# Patient Record
Sex: FEMALE | Race: WHITE
[De-identification: names, ages, dates, MRNs, and addresses within clinical notes are randomized per-mention and may not be internally consistent; named-entity substitution may affect disease eponyms.]

---

## 2017-04-23 ENCOUNTER — HOSPITAL ENCOUNTER (EMERGENCY)
Dept: HOSPITAL 47 - EC | Age: 27
Discharge: HOME | End: 2017-04-23
Payer: COMMERCIAL

## 2017-04-23 VITALS
HEART RATE: 70 BPM | DIASTOLIC BLOOD PRESSURE: 71 MMHG | SYSTOLIC BLOOD PRESSURE: 113 MMHG | TEMPERATURE: 96.8 F | RESPIRATION RATE: 18 BRPM
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
## 2017-04-23 DIAGNOSIS — Z88.8: ICD-10-CM

## 2017-04-23 DIAGNOSIS — F90.9: ICD-10-CM

## 2017-04-23 DIAGNOSIS — Z79.899: ICD-10-CM

## 2017-04-23 DIAGNOSIS — S60.222A: Primary | ICD-10-CM

## 2017-04-23 DIAGNOSIS — E07.9: ICD-10-CM
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
## 2017-04-23 PROCEDURE — 99283 EMERGENCY DEPT VISIT LOW MDM: CPT

## 2017-04-23 NOTE — XR
Left hand

 

HISTORY: Trauma and pain

 

Reviews of the left hand

 

No comparisons

 

Bone mineralization, joint spaces and alignment are maintained

 

IMPRESSION: No fracture or dislocation.

## 2017-04-23 NOTE — ED
Upper Extremity HPI





- General


Chief Complaint: Extremity Injury, Upper


Stated Complaint: hand injury


Time Seen by Provider: 04/23/17 21:32


Source: patient


Mode of arrival: ambulatory


Limitations: no limitations





- History of Present Illness


Initial Comments: 


Patient is a right-handed 26-year-old white female presenting to the emergency 

department with complaints of left hand pain.  Patient states she was doing 

yard work at 11 AM this morning when a log rolled on her hand.  Patient states 

that throughout the day the pain became aggressively worse and she wanted to 

make sure nothing was broken.  Patient denies previous trauma or surgery to 

left upper extremity.  Patient states she took some Motrin about 1:00 this 

afternoon with relief.  Patient currently rates pain 6 out of 10, described as 

sore, exacerbated with movement and palpation, relieved with rest, denies 

numbness or tingling.





Handedness: right


Place: outdoors


Severity scale (1-10): 7


Improves With: immobilization


Worsens With: movement of extremity


Associated Symptoms: denies other symptoms





- Related Data


 Home Medications











 Medication  Instructions  Recorded  Confirmed


 


Levothyroxine Sodium [Synthroid] 100 mcg PO DAILY 03/03/15 01/13/16


 


Dextroamphetamine/Amphetamine 20 mg PO BID 01/13/16 01/13/16





[Adderall]   








 Previous Rx's











 Medication  Instructions  Recorded


 


Benzonatate [Tessalon] 200 mg PO TID PRN #20 cap 01/13/16











 Allergies











Allergy/AdvReac Type Severity Reaction Status Date / Time


 


prochlorperazine maleate Allergy  Unknown Verified 04/23/17 21:32





[From Compazine]     














Review of Systems


ROS Statement: 


Those systems with pertinent positive or pertinent negative responses have been 

documented in the HPI.





ROS Other: All systems not noted in ROS Statement are negative.





Past Medical History


Past Medical History: Thyroid Disorder


Additional Past Medical History / Comment(s): Mild Anemia.  known B-thalasemia 

minor.  Rubella Non-Immune


History of Any Multi-Drug Resistant Organisms: None Reported


Past Surgical History: Cholecystectomy


Additional Past Surgical History / Comment(s): Thyroidectomy


Past Psychological History: ADD/ADHD, Anxiety


Smoking Status: Never smoker


Past Alcohol Use History: Occasional


Past Drug Use History: None Reported





General Exam





- General Exam Comments


Initial Comments: 


GENERAL: Pt awake and alert, well-appearing, well-nourished, and in no acute 

distress.


HEAD: Atraumatic, normocephalic.


EYES: Pupils equal, round, and reactive to light, sclera anicteric, conjunctiva 

are normal.


ENT: Oropharynx clear without exudates.  Moist mucous membranes. 


NECK:Normal range of motion, supple without lymphadenopathy or JVD. 


LUNGS: Breath sounds clear to auscultation bilaterally.  No wheezes, rales, or 

rhonchi.


HEART: Heart S1, S2, no S3 or S4.  Regular rate and rhythm.  No murmurs, rubs 

or gallops.


ABDOMEN: Soft, nontender, nondistended, normoactive bowel sounds.  No guarding, 

no rebound.  No masses or organomegaly appreciated. 


EXTREMITIES: Palpable peripheral pulses.  Ecchymosis noted to the dorsal left 

hand.


NEUROLOGICAL: Pt oriented x 3. Cranial nerves II through XII grossly intact. 

Strength and sensation grossly intact.


PSYCH: Normal mood, normal affect.


SKIN: Warm, dry, intact.





Limitations: no limitations


  ** Left


Forearm Wrist exam: Present: normal inspection, full ROM.  Absent: tenderness, 

swelling


Hand Wrist exam: Present: full ROM, tenderness, swelling, ecchymosis.  Absent: 

deformity, dislocation, erythema


Neuro motor exam: Present: wrist extension intact, thumb opposition intact, 

thumb IP flexion intact, thumb adduction intact, fingers 2-5 abduction intact


Neurosensory exam: Present: 2-point discrimination, radial nerve intact, ulnar 

nerve intact, median nerve intact


Vascular: Present: vascular compromise, normal capillary refill, radial pulse, 

brachial pulse, ulnar pulse





Course


 Vital Signs











  04/23/17





  21:27


 


Temperature 96.8 F L


 


Pulse Rate 70


 


Respiratory 18





Rate 


 


Blood Pressure 113/71


 


O2 Sat by Pulse 100





Oximetry 














Medical Decision Making





- Medical Decision Making


Contusion to left hand.  X-ray of left hand negative for fracture or 

dislocation.  Patient instructed to follow-up with primary care physician or 

orthopedic surgeon if symptoms do not improve or get worse.  Discharge 

instructions and return parameters reviewed with patient.  Patient agrees with 

treatment plan.








- Radiology Data


Radiology results: report reviewed


X-ray of left hand: No fracture or dislocation.  As read by radiologist.





Disposition


Clinical Impression: 


 Contusion of left hand





Disposition: HOME SELF-CARE


Condition: Good


Instructions:  Contusion in Adults (ED)


Additional Instructions: 


Continue Motrin and Tylenol for swelling or pain.  May continue ice 4 times a 

day for 10-15 minutes the next 2 days to decrease swelling.  Follow-up with 

primary care physician as needed.  Please return to the emergency department 

with any worsening symptoms.


Referrals: 


Israel Winston MD [Primary Care Provider] - 1-2 days


Amor Cid DO [Doctor of Osteopathic Medicine] - 1-2 days (Follow-up as 

needed)


Time of Disposition: 21:59

## 2018-04-12 ENCOUNTER — HOSPITAL ENCOUNTER (OUTPATIENT)
Dept: HOSPITAL 47 - FBPOP | Age: 28
Discharge: HOME | End: 2018-04-12
Payer: COMMERCIAL

## 2018-04-12 VITALS
HEART RATE: 78 BPM | TEMPERATURE: 97 F | SYSTOLIC BLOOD PRESSURE: 112 MMHG | RESPIRATION RATE: 16 BRPM | DIASTOLIC BLOOD PRESSURE: 67 MMHG

## 2018-04-12 DIAGNOSIS — O26.92: Primary | ICD-10-CM

## 2018-04-12 DIAGNOSIS — Z3A.24: ICD-10-CM

## 2018-04-12 LAB
PH UR: 5.5 [PH] (ref 5–8)
RBC UR QL: 1 /HPF (ref 0–5)
SP GR UR: 1.02 (ref 1–1.03)
SQUAMOUS UR QL AUTO: 10 /HPF (ref 0–4)
UROBILINOGEN UR QL STRIP: <2 MG/DL (ref ?–2)
WBC #/AREA URNS HPF: 1 /HPF (ref 0–5)

## 2018-04-12 PROCEDURE — 81001 URINALYSIS AUTO W/SCOPE: CPT

## 2018-04-12 PROCEDURE — 99213 OFFICE O/P EST LOW 20 MIN: CPT

## 2018-04-23 ENCOUNTER — HOSPITAL ENCOUNTER (OUTPATIENT)
Dept: HOSPITAL 47 - FBPOP | Age: 28
Discharge: HOME | End: 2018-04-23
Payer: COMMERCIAL

## 2018-04-23 VITALS
RESPIRATION RATE: 16 BRPM | SYSTOLIC BLOOD PRESSURE: 122 MMHG | HEART RATE: 80 BPM | TEMPERATURE: 97.6 F | DIASTOLIC BLOOD PRESSURE: 62 MMHG

## 2018-04-23 DIAGNOSIS — Z3A.25: ICD-10-CM

## 2018-04-23 DIAGNOSIS — R10.30: ICD-10-CM

## 2018-04-23 DIAGNOSIS — O26.93: Primary | ICD-10-CM

## 2018-04-23 PROCEDURE — 99213 OFFICE O/P EST LOW 20 MIN: CPT

## 2018-05-06 NOTE — P.MSEPDOC
Presenting Problems





- Arrival Data


Date of Arrival on Unit: 18


Time of Arrival on Unit: 19:50


Mode of Transport: Ambulatory





- Complaint


Comment: Complaints of contractions.





Prenatal Medical History





- Pregnancy Information


: 2


Para: 1


Term: 1


: 0


Abortions: Spontaneous or Elective: 0


Number of Living Children: 1





- Gestational Age


Gestational Age by DESTINEY (wks/days): 24 Weeks and 1 Days





Review of Systems





- Review of Systems


Constitutional: No problems


Breast: No problems


ENT: No problems


Cardiovascular: No problems


Respiratory: No problems


Gastrointestinal: No problems


Genitourinary: No problems


Musculoskeletal: No problems


Neurological: No problems


Skin: No problems





Vital Signs





- Temperature


Temperature: 97 F


Temperature Source: Temporal Artery Scan





- Pulse


  ** Right Brachial


Pulse Rate: 78


Pulse Assessment Method: Automatic Cuff





- Respirations


Respiratory Rate: 16


Oxygen Delivery Method: Room Air





- Blood Pressure


  ** Right Arm


Blood Pressure: 112/67


Blood Pressure Mean: 82


Blood Pressure Source: Automatic Cuff





Medical Screen Scoring (Pre)





- Cervical Exam


Dilation: Exam Deferred


Effacement: Exam Deferred


Membranes: Intact





- Uterine Contractions


Frequency: N/A


Duration: N/A


Intensity: N/A





- Maternal Vital Signs


Maternal Temperature: N/A


Maternal Blood Pressure: N/A


Signs of Preeclampsia: N/A


Maternal Respirations: N/A





- Pain Assessment


Pain Scale Used: Numeric (1 - 10)


Pain Intensity: 0





- Fetal Assessment


Baseline FHR: 140


Fetal Heart Rate - NICHD Category: Category I (Normal) = 0


Fetal Position: N/A


Fetal Station: N/A





- Total Score


Total Score (Pre): 0





- Level of Risk


Level of Risk: Low (0-5)





Physician Notification (Pre)





- Physician Notified


Physician Notified Date: 18


Physician Notified Time: 20:28


Physician/Practitioner Notifed:: Dr. De Jesus


Spoke With: Dr. De Jesus


New Order Received: Yes





- Notification Comment


Comment: agree with RNs assessment





Disposition





- Disposition


OB Disposition: Discharge to home, Written follow up instructions reviewed


Discharge Date: 18


Discharge Time: 20:30


I agree with the RN Medical Screening Exam: Yes


Risk & Benefit of care provided described in d/c instruction: Yes


Diagnosis: PREGNANCY RELATED CONDITIONS, UNSPECIFIED, SECOND TRIMESTER

## 2018-05-06 NOTE — P.MSEPDOC
Presenting Problems





- Arrival Data


Date of Arrival on Unit: 18


Time of Arrival on Unit: 09:06


Mode of Transport: Wheelchair





- Complaint


OB-Reason for Admission/Chief Complaint: Pain


Comment: lower abd pain and pressure. constant





Prenatal Medical History





- Pregnancy Information


: 2


Para: 1


Term: 1


: 0


Abortions: Spontaneous or Elective: 0


Number of Living Children: 1





- Gestational Age


Gestational Age by DESTINEY (wks/days): 25 Weeks and 5 Days





Review of Systems





- Review of Systems


Constitutional: No problems


Breast: No problems


ENT: No problems


Cardiovascular: No problems


Respiratory: No problems


Gastrointestinal: No problems


Genitourinary: No problems


Musculoskeletal: No problems


Neurological: No problems


Skin: No problems


Comment: hx of pain and pressure lower abd, after working as  3 nights. 

pain scale 10 today. on pastc/s incision line incision line





Vital Signs





- Temperature


Temperature: 97.6 F


Temperature Source: Tympanic





- Pulse


  ** Right Radial


Pulse Rate: 80


Pulse Assessment Method: Automatic Cuff





- Respirations


Respiratory Rate: 16


Oxygen Delivery Method: Room Air





- Blood Pressure


  ** Right Arm


Blood Pressure: 122/62


Blood Pressure Mean: 82


Blood Pressure Source: Automatic Cuff





Medical Screen Scoring (Pre)





- Cervical Exam


Dilation: Exam Deferred





- Uterine Contractions


Frequency: N/A





- Maternal Vital Signs


Maternal Temperature: N/A


Maternal Blood Pressure: N/A


Signs of Preeclampsia: N/A


Maternal Respirations: N/A





- Pain Assessment


Pain Location and Character: Lower, Abdomen


Pain Scale Used: Numeric (1 - 10)


Pain Description: *Acute, Burning, Pressure


Pain Frequency: Constant


Pain Duration: 10


Pain Behavior: Facial Grimacing, Guarding, Moving Slowly


Pain Aggravating Factors: Standing, Walking


Pharmacological Interventions: Discuss Pain Med Options, PRN Medication





- Maternal Trauma


Maternal Trauma: N/A





- Fetal Assessment


Baseline FHR: 140


Fetal Heart Rate - NICHD Category: Category I (Normal) = 0


NST: Reactive





- Total Score


Total Score (Pre): 0





- Level of Risk


Level of Risk: Low (0-5)





Physician Notification (Pre)





- Physician Notified


Physician Notified Date: 18


Physician Notified Time: 10:10


Physician/Practitioner Notifed:: chen


Spoke With: chen


New Order Received: Yes





- Notification Comment


Comment: home off work next couple of days. if not emproving to call office or 

return to triage. warm to lower abd, tylenol prn





Disposition





- Disposition


OB Disposition: Discharge to home


Discharge Date: 18


Discharge Time: 10:20


I agree with the RN Medical Screening Exam: Yes


Risk & Benefit of care provided described in d/c instruction: Yes


Diagnosis: PREGNANCY RELATED CONDITIONS, UNSPECIFIED, SECOND TRIMESTER

## 2018-07-05 ENCOUNTER — HOSPITAL ENCOUNTER (OUTPATIENT)
Dept: HOSPITAL 47 - FBPOP | Age: 28
Discharge: HOME | End: 2018-07-05
Attending: OBSTETRICS & GYNECOLOGY
Payer: COMMERCIAL

## 2018-07-05 VITALS
HEART RATE: 99 BPM | RESPIRATION RATE: 16 BRPM | DIASTOLIC BLOOD PRESSURE: 71 MMHG | SYSTOLIC BLOOD PRESSURE: 117 MMHG | TEMPERATURE: 97.4 F

## 2018-07-05 DIAGNOSIS — Z3A.36: ICD-10-CM

## 2018-07-05 DIAGNOSIS — O26.93: Primary | ICD-10-CM

## 2018-07-05 PROCEDURE — 59025 FETAL NON-STRESS TEST: CPT

## 2018-07-05 PROCEDURE — 99213 OFFICE O/P EST LOW 20 MIN: CPT

## 2018-07-11 ENCOUNTER — HOSPITAL ENCOUNTER (OUTPATIENT)
Dept: HOSPITAL 47 - FBPOP | Age: 28
Discharge: HOME | End: 2018-07-11
Attending: OBSTETRICS & GYNECOLOGY
Payer: COMMERCIAL

## 2018-07-11 VITALS
HEART RATE: 83 BPM | SYSTOLIC BLOOD PRESSURE: 129 MMHG | TEMPERATURE: 97.1 F | DIASTOLIC BLOOD PRESSURE: 69 MMHG | RESPIRATION RATE: 18 BRPM

## 2018-07-11 DIAGNOSIS — Z3A.37: ICD-10-CM

## 2018-07-11 DIAGNOSIS — O26.93: Primary | ICD-10-CM

## 2018-07-11 LAB
PH UR: 6.5 [PH] (ref 5–8)
RBC UR QL: <1 /HPF (ref 0–5)
SP GR UR: 1.01 (ref 1–1.03)
SQUAMOUS UR QL AUTO: 3 /HPF (ref 0–4)
UROBILINOGEN UR QL STRIP: <2 MG/DL (ref ?–2)
WBC #/AREA URNS HPF: 1 /HPF (ref 0–5)

## 2018-07-11 PROCEDURE — 81001 URINALYSIS AUTO W/SCOPE: CPT

## 2018-07-11 PROCEDURE — 59025 FETAL NON-STRESS TEST: CPT

## 2018-07-11 PROCEDURE — 99213 OFFICE O/P EST LOW 20 MIN: CPT

## 2018-07-25 ENCOUNTER — HOSPITAL ENCOUNTER (INPATIENT)
Dept: HOSPITAL 47 - 4FBP | Age: 28
LOS: 2 days | Discharge: HOME | End: 2018-07-27
Attending: OBSTETRICS & GYNECOLOGY | Admitting: OBSTETRICS & GYNECOLOGY
Payer: COMMERCIAL

## 2018-07-25 VITALS — BODY MASS INDEX: 32.1 KG/M2

## 2018-07-25 DIAGNOSIS — D56.3: ICD-10-CM

## 2018-07-25 DIAGNOSIS — Z86.59: ICD-10-CM

## 2018-07-25 DIAGNOSIS — K21.9: ICD-10-CM

## 2018-07-25 DIAGNOSIS — Z30.2: ICD-10-CM

## 2018-07-25 DIAGNOSIS — N85.8: ICD-10-CM

## 2018-07-25 DIAGNOSIS — Z90.49: ICD-10-CM

## 2018-07-25 DIAGNOSIS — Z79.899: ICD-10-CM

## 2018-07-25 DIAGNOSIS — O34.211: Primary | ICD-10-CM

## 2018-07-25 DIAGNOSIS — Z3A.39: ICD-10-CM

## 2018-07-25 DIAGNOSIS — E89.0: ICD-10-CM

## 2018-07-25 LAB
BASOPHILS # BLD AUTO: 0 K/UL (ref 0–0.2)
BASOPHILS NFR BLD AUTO: 0 %
EOSINOPHIL # BLD AUTO: 0.1 K/UL (ref 0–0.7)
EOSINOPHIL NFR BLD AUTO: 1 %
ERYTHROCYTE [DISTWIDTH] IN BLOOD BY AUTOMATED COUNT: 4.6 M/UL (ref 3.8–5.4)
ERYTHROCYTE [DISTWIDTH] IN BLOOD: 17.7 % (ref 11.5–15.5)
HCT VFR BLD AUTO: 28.7 % (ref 34–46)
HGB BLD-MCNC: 8.6 GM/DL (ref 11.4–16)
LYMPHOCYTES # SPEC AUTO: 1.7 K/UL (ref 1–4.8)
LYMPHOCYTES NFR SPEC AUTO: 19 %
MCH RBC QN AUTO: 18.7 PG (ref 25–35)
MCHC RBC AUTO-ENTMCNC: 30 G/DL (ref 31–37)
MCV RBC AUTO: 62.4 FL (ref 80–100)
MONOCYTES # BLD AUTO: 0.5 K/UL (ref 0–1)
MONOCYTES NFR BLD AUTO: 5 %
NEUTROPHILS # BLD AUTO: 6.2 K/UL (ref 1.3–7.7)
NEUTROPHILS NFR BLD AUTO: 72 %
PLATELET # BLD AUTO: 118 K/UL (ref 150–450)
WBC # BLD AUTO: 8.7 K/UL (ref 3.8–10.6)

## 2018-07-25 PROCEDURE — 85025 COMPLETE CBC W/AUTO DIFF WBC: CPT

## 2018-07-25 PROCEDURE — 86900 BLOOD TYPING SEROLOGIC ABO: CPT

## 2018-07-25 PROCEDURE — 86901 BLOOD TYPING SEROLOGIC RH(D): CPT

## 2018-07-25 PROCEDURE — 86850 RBC ANTIBODY SCREEN: CPT

## 2018-07-25 PROCEDURE — 0UL70CZ OCCLUSION OF BILATERAL FALLOPIAN TUBES WITH EXTRALUMINAL DEVICE, OPEN APPROACH: ICD-10-PCS

## 2018-07-25 RX ADMIN — KETOROLAC TROMETHAMINE PRN MG: 30 INJECTION, SOLUTION INTRAMUSCULAR at 20:44

## 2018-07-25 RX ADMIN — KETOROLAC TROMETHAMINE PRN MG: 30 INJECTION, SOLUTION INTRAMUSCULAR at 13:37

## 2018-07-25 RX ADMIN — DOCUSATE SODIUM AND SENNOSIDES SCH EACH: 50; 8.6 TABLET ORAL at 20:44

## 2018-07-25 NOTE — P.OP
Date of Procedure: 18


Preoperative Diagnosis: 


#1.  39-0/7 weeks, previous  section #2.  Undesired fertility


Postoperative Diagnosis: 


Same


Procedure(s) Performed: 


#1.  Repeat low transverse  section #2.  Intraoperative bilateral tubal 

occlusion with Filshie clips


Anesthesia: spinal


Surgeon: Shaka De Jesus


Assistant #1: Nadiya Bowman


Estimated Blood Loss (ml): 800


IV fluids (ml): 1,100


Urine output (ml): 250


Pathology: none sent


Condition: stable


Disposition: PACU


Operative Findings: 


Intraoperatively, there was a mild to moderate amount of scarring at the level 

of the fascia and rectus muscles.  The patient was delivered of a viable 7 lbs. 

12 oz. baby boy with Apgars of 10 at 1 minute and 10 at 5 minutes.  There was a 

loose nuchal cord 1 which was reduced after delivery of the fetal head.  The 

placenta was delivered manually, intact, and grossly normal with a grossly 

normal three-vessel cord.  The uterus, tubes, and ovaries were entirely normal 

to inspection.


Description of Procedure: 


The patient was prepped and draped in usual fashion after spinal anesthesia was 

administered by the anesthesiologist.  A Pfannenstiel incision was made through 

pre-existing scar and extended into the abdominal cavity without difficulty.  

Minimal scarring was found at the level of the fascia and rectus muscles.  The 

bladder peritoneum was elevated, incised, and reflected distally.  A 2 cm 

incision was made in the transverse plane of the lower uterine segment to enter 

the uterus at which time clear fluid was noted.  The incision was extended in 

both directions with bandage scissors and then somewhat bluntly.  The fetal 

head was delivered up and through the incision where the nose and mouth were 

thoroughly suctioned.  A nuchal cord was reduced 1.  The remainder of the 

infant was delivered onto the abdomen where the cord was doubly clamped, cut, 

and the infant passed for resuscitative measures with weight and Apgars as 

noted above.  A segment of cord was doubly clamped, cut, and set aside should 

cord gases become necessary.  The placenta was delivered manually and intact as 

noted above.  Uterus was exteriorized and the interior cavity of the uterus 

swept of any remaining placental or membranous fragments.  The margins of the 

incision were grasped with Flores clamps and the incision closed in a 

single running locking stitch of 0 chromic catgut from margin to margin.  

Following closure, a portion of the incision on the right side near the angle 

was noted to be bleeding still and made hemostatic with a figure-of-eight 

stitch of 0 chromic catgut.  The posterior cul-de-sac was then suctioned with a 

guard.  After reaffirming with the patient her desire for tubal ligation, a 

Filshie clip was placed across the isthmic portion of the fallopian tube on 

each side approximately 3 cm from the cornu of the uterus and firmly closed.  

This was carried out bilaterally.  The uterus was replaced within the abdominal 

cavity after suctioning the posterior cul-de-sac with a guard and the gutters 

were swept of any remaining blood, fluid or clot.  The incision was reexamined 

and any small points of bleeding made hemostatic with the Bovie.  Once 

hemostasis was confirmed, the parietal peritoneum was loosely reapproximated in 

the layer of muscles examined and made hemostatic with the Bovie.  The fascia 

was closed with a single running stitch of 0 Vicryl proceeding from margin to 

margin.  The subcutaneous tissues were irrigated, made hemostatic with the Bovie

, and reapproximated with a running stitch of 30 plain catgut.  The skin was 

reapproximated with a running subcuticular stitch of 4-0 Vicryl proceeding from 

margin to margin.  This was followed by placement of half-inch Steri-Strips 

with Mastisol.  Estimated blood loss for the entire case was approximately 800 

mL, primarily at secondary to some initial uterine atony which was resolved by 

giving Methergine intraoperatively.  All sponge, instrument, and needle counts 

were correct.  There were no complications.  The patient tolerated the 

procedure well and proceeded to the recovery room in stable condition.  Both 

mother and infant are resting comfortably in recovery.

## 2018-07-25 NOTE — P.HPOB
History of Present Illness


H&P Date: 18


Chief Complaint: 39-0/7 weeks, previous  section, undesired fertility





The patient is a 27-year-old  3 para 1011 admitted at 39-0/7 weeks as 

established by 10 week ultrasound.  She is admitted for repeat low transverse 

 section with intraoperative bilateral tubal occlusion using Filshie 

clips.  Risks and complications admitted thoroughly discussed.  She has signed 

consent for these procedures in the office.  Her pregnancy has been entirely 

uncomplicated and group B strep status is negative.  She does carry a history 

of beta thalassemia minor which causes chronic anemia which has continued to be 

present during the pregnancy.





Obstetrical history:  3 para 1011 with 1 term  delivery for 

arrest of dilation and descent and one early spontaneous AB resulting in D&C.  

Current pregnancy statistics are listed in history present illness.  EDC of 2018 was established by 10 week ultrasound.  Laboratory workup demonstrates 

a blood type of O+ with a negative antibody screen.  Rubella status is immune.  

All other laboratory workup was within normal limits aside from the 

aforementioned mild anemia.  One hour Glucola was normal and group B strep 

status is negative.





Gynecologic history: Unremarkable with no history of any infections to include 

STDs.





Review of Systems





Review of systems is confined to history of present illness.





Past Medical History


Past Medical History: GERD/Reflux, Thyroid Disorder


Additional Past Medical History / Comment(s): Mild Anemia.  known B-thalasemia 

minor


History of Any Multi-Drug Resistant Organisms: None Reported


Past Surgical History: Adenoidectomy,  Section, Cholecystectomy, 

Tonsillectomy


Additional Past Surgical History / Comment(s): partial Thyroidectomy


Past Anesthesia/Blood Transfusion Reactions: Postoperative Nausea & Vomiting (

PONV)


Past Psychological History: ADD/ADHD, Anxiety


Smoking Status: Never smoker


Past Alcohol Use History: None Reported


Past Drug Use History: None Reported





- Past Family History


  ** Mother


Family Medical History: No Reported History





Medications and Allergies


 Home Medications











 Medication  Instructions  Recorded  Confirmed  Type


 


Omeprazole [PriLOSEC] 40 mg PO DAILY 18 History


 


Acetaminophen [Tylenol] 500 mg PO Q4-6H PRN 18 History











 Allergies











Allergy/AdvReac Type Severity Reaction Status Date / Time


 


prochlorperazine maleate Allergy  Unknown Verified 18 11:22





[From Compazine]     














Exam


 Vital Signs











  Temp Pulse Resp BP Pulse Ox


 


 18 12:59  96.9 F L  85  18  129/61  98


 


 18 10:29  97.6 F  91  18   98








 Intake and Output











 18





 22:59 06:59 14:59


 


Other:   


 


  Voiding Method   Indwelling Catheter














In general, this is a well-developed, well-nourished white female in no acute 

distress.  Her heart has a regular rhythm and rate without murmur.  Her lungs 

are clear to auscultation bilaterally in all fields.  Her abdomen is gravid, 

nondistended, has normal active bowel sounds, is soft, nontender, and without 

any palpable masses aside from uterine fundus.  Her extremities are without any 

cyanosis, clubbing, or significant edema and are nontender to palpation 

bilaterally.  Digital cervical examination is deferred.





Results


Result Diagrams: 


 18 10:33





 Abnormal Lab Results - Last 24 Hours (Table)











  18 Range/Units





  10:33 


 


Hgb  8.6 L  (11.4-16.0)  gm/dL


 


Hct  28.7 L  (34.0-46.0)  %


 


MCV  62.4 L  (80.0-100.0)  fL


 


MCH  18.7 L  (25.0-35.0)  pg


 


MCHC  30.0 L  (31.0-37.0)  g/dL


 


RDW  17.7 H  (11.5-15.5)  %


 


Plt Count  118 L  (150-450)  k/uL














Assessment and Plan


(1) Term pregnancy


Current Visit: Yes   Status: Acute   Code(s): Z34.80 - ENCOUNTER FOR SUPRVSN OF 

NORMAL PREGNANCY, UNSP TRIMESTER   SNOMED Code(s): 29136724


   





(2) Previous  section


Current Visit: Yes   Status: Acute   Code(s): Z98.891 - HISTORY OF UTERINE SCAR 

FROM PREVIOUS SURGERY   SNOMED Code(s): 230904518


   





(3) Family planning


Current Visit: Yes   Status: Acute   Code(s): Z30.09 - ENCOUNTER FOR OT 

GENERAL CNSL AND ADVICE ON CONTRACEPTION   SNOMED Code(s): 730903013


   


Plan: 





The patient is admitted for repeat low transverse  section with 

intraoperative bilateral tubal occlusion using Filshie clips.  There is some 

complications have been thoroughly discussed and she has understood and agreed 

to proceed.  She understands the permanent nature of tubal ligation and has 

reiterated on multiple occasions her desire for this procedure.

## 2018-07-26 LAB
BASOPHILS # BLD AUTO: 0 K/UL (ref 0–0.2)
BASOPHILS NFR BLD AUTO: 0 %
EOSINOPHIL # BLD AUTO: 0.1 K/UL (ref 0–0.7)
EOSINOPHIL NFR BLD AUTO: 1 %
ERYTHROCYTE [DISTWIDTH] IN BLOOD BY AUTOMATED COUNT: 3.7 M/UL (ref 3.8–5.4)
ERYTHROCYTE [DISTWIDTH] IN BLOOD: 17.7 % (ref 11.5–15.5)
HCT VFR BLD AUTO: 22.8 % (ref 34–46)
HGB BLD-MCNC: 7 GM/DL (ref 11.4–16)
LYMPHOCYTES # SPEC AUTO: 1.4 K/UL (ref 1–4.8)
LYMPHOCYTES NFR SPEC AUTO: 12 %
MCH RBC QN AUTO: 19 PG (ref 25–35)
MCHC RBC AUTO-ENTMCNC: 30.8 G/DL (ref 31–37)
MCV RBC AUTO: 61.6 FL (ref 80–100)
MONOCYTES # BLD AUTO: 0.8 K/UL (ref 0–1)
MONOCYTES NFR BLD AUTO: 7 %
NEUTROPHILS # BLD AUTO: 9.1 K/UL (ref 1.3–7.7)
NEUTROPHILS NFR BLD AUTO: 79 %
PLATELET # BLD AUTO: 99 K/UL (ref 150–450)
WBC # BLD AUTO: 11.6 K/UL (ref 3.8–10.6)

## 2018-07-26 RX ADMIN — DOCUSATE SODIUM AND SENNOSIDES SCH EACH: 50; 8.6 TABLET ORAL at 19:37

## 2018-07-26 RX ADMIN — HYDROCODONE BITARTRATE AND ACETAMINOPHEN PRN EACH: 5; 325 TABLET ORAL at 15:47

## 2018-07-26 RX ADMIN — KETOROLAC TROMETHAMINE PRN MG: 30 INJECTION, SOLUTION INTRAMUSCULAR at 12:14

## 2018-07-26 RX ADMIN — IBUPROFEN PRN MG: 600 TABLET ORAL at 17:55

## 2018-07-26 RX ADMIN — DOCUSATE SODIUM AND SENNOSIDES SCH: 50; 8.6 TABLET ORAL at 08:09

## 2018-07-26 RX ADMIN — HYDROCODONE BITARTRATE AND ACETAMINOPHEN PRN EACH: 5; 325 TABLET ORAL at 08:07

## 2018-07-26 RX ADMIN — HYDROCODONE BITARTRATE AND ACETAMINOPHEN PRN EACH: 5; 325 TABLET ORAL at 19:37

## 2018-07-26 NOTE — P.PN
Progress Note - Text





Postop day 1 from  under spinal anesthesia with intrathecal morphine 

given for postop pain management.  


Patient is doing well.


Pain is well controlled. On visual analog scale 3/10


Mild itching present


No nausea or vomiting reported.


No Headache or weakness and numbness in the legs.


No complications from spinal anesthesia.

## 2018-07-26 NOTE — P.PNOBGPC
Subjective





- Subjective


Patient reports: Reports appetite normal, Reports voiding normally, Reports 

pain well controlled, Reports ambulating normally


[place]: doing well





Objective





- Vital Signs


Latest vital signs: 


 Vital Signs











  Temp Pulse Resp BP Pulse Ox


 


 18 08:00  98.2 F  80  18  111/74  99


 


 18 20:00  98 F  81  16  116/68  97


 


 18 14:59  96.2 F L  88  18  110/64  99


 


 18 14:29  97.3 F L  78  18  117/73 


 


 18 13:59   90  18  129/74  98


 


 18 13:44  96.7 F L  80  18  127/72  98


 


 18 13:29   66  18  115/65  98


 


 18 13:14   106 H  18  114/55 


 


 18 12:59  96.9 F L  85  18  129/61  98


 


 18 10:29  97.6 F  91  18   98








 Intake and Output











 18





 22:59 06:59 14:59


 


Intake Total 900  


 


Output Total 350  


 


Balance 550  


 


Intake:   


 


  Intake, IV Titration 900  





  Amount   


 


    Oxytocin 20 Units/1000 ml 900  





    Ns 1,000 ml @ Per   





    Protocol IV .Q0M [facility] Rx#:   





    [id number]   


 


Output:   


 


  Urine 350  


 


    Uretheral ([place]) 350  


 


Other:   


 


  Voiding Method Indwelling Catheter  


 


  # Voids   1














- Exam


Extremities: Present: normal


Abdomen: Present: normal appearance, soft.  Absent: distention, tenderness


Incision: Present: normal, dry, intact


Uterus: Present: normal, firm (The uterine fundus is tonic and nontender below 

the umbilicus.)





- Labs


Labs: 


 Abnormal Lab Results - Last 24 Hours (Table)











  18 Range/Units





  10:33 07:04 


 


WBC   11.6 H  (3.8-10.6)  k/uL


 


RBC   3.70 L  (3.80-5.40)  m/uL


 


Hgb  8.6 L  7.0 L* D  (11.4-16.0)  gm/dL


 


Hct  28.7 L  22.8 L  (34.0-46.0)  %


 


MCV  62.4 L  61.6 L  (80.0-100.0)  fL


 


MCH  18.7 L  19.0 L  (25.0-35.0)  pg


 


MCHC  30.0 L  30.8 L  (31.0-37.0)  g/dL


 


RDW  17.7 H  17.7 H  (11.5-15.5)  %


 


Plt Count  118 L  99 L  (150-450)  k/uL


 


Neutrophils #   9.1 H  (1.3-7.7)  k/uL














Assessment and Plan


(1) Term pregnancy


Current Visit: Yes   Status: Acute   Code(s): Z34.80 - ENCOUNTER FOR SUPRVSN OF 

NORMAL PREGNANCY, UNSP TRIMESTER   SNOMED Code(s): 89896742


   





(2) Previous  section


Current Visit: Yes   Status: Acute   Code(s): Z98.891 - HISTORY OF UTERINE SCAR 

FROM PREVIOUS SURGERY   SNOMED Code(s): 128424098


   





(3) Family planning


Current Visit: Yes   Status: Acute   Code(s): Z30.09 - ENCOUNTER FOR OT 

GENERAL CNSL AND ADVICE ON CONTRACEPTION   SNOMED Code(s): 546823479


   





(4)  delivery delivered


Current Visit: No   Status: Acute   Comment: Routine care.  Discharge home today

, remove staples prior to discharge.  F/U 2 wk/6 wk.  Instructions given.  Will 

check with Hematology regarding iron supplementation in the case of beta 

thalassemia, no sxs from anemia.   Code(s): O82 - ENCOUNTER FOR  

DELIVERY WITHOUT INDICATION   SNOMED Code(s): 988001916


   


Plan: 





Continue routine postoperative care.  I would anticipate discharge home 

tomorrow pending complications.  I have strongly encouraged the patient to 

ambulate in the halls routinely.  She has been found to be significantly anemic 

but has known beta thalassemia minor leading to chronic anemia in the 

presurgical scenario.  As she is asymptomatic we will continue to follow her 

symptoms and encouraged her to use iron sulfate when she leaves the hospital.

## 2018-07-27 VITALS
HEART RATE: 86 BPM | RESPIRATION RATE: 20 BRPM | TEMPERATURE: 98.3 F | SYSTOLIC BLOOD PRESSURE: 117 MMHG | DIASTOLIC BLOOD PRESSURE: 76 MMHG

## 2018-07-27 RX ADMIN — HYDROCODONE BITARTRATE AND ACETAMINOPHEN PRN EACH: 5; 325 TABLET ORAL at 04:06

## 2018-07-27 RX ADMIN — IBUPROFEN PRN MG: 600 TABLET ORAL at 07:44

## 2018-07-27 RX ADMIN — HYDROCODONE BITARTRATE AND ACETAMINOPHEN PRN EACH: 5; 325 TABLET ORAL at 09:01

## 2018-07-27 RX ADMIN — DOCUSATE SODIUM AND SENNOSIDES SCH EACH: 50; 8.6 TABLET ORAL at 07:45

## 2018-07-27 RX ADMIN — IBUPROFEN PRN MG: 600 TABLET ORAL at 00:05

## 2018-07-27 NOTE — P.DS
Providers


Date of admission: 


18 10:07





Expected date of discharge: 18


Attending physician: 


Shaka De Jesus





Primary care physician: 


Stated None








- Discharge Diagnosis(es)


(1) Term pregnancy


Current Visit: Yes   Status: Acute   





(2) Previous  section


Current Visit: Yes   Status: Acute   





(3) Family planning


Current Visit: Yes   Status: Acute   





(4)  delivery delivered


Current Visit: No   Status: Acute   


Hospital Course: 





The patient is a 27-year-old  3 para 1011 admitted at 39-0/7 weeks by 

good dating parameters Kvng's admitted for repeat low transverse  

section with intraoperative tubal ligation using Filshie clips.  Her pregnancy 

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 

minor causing some chronic anemia which has continued to be present during the 

pregnancy.  On labor and delivery, all signs reassuring.  She was taken to the 

operating room where she was delivered of a viable 7 lbs. 12 oz. baby boy with 

Apgars of 10 at 1 minute and 10 at 5 minutes.  Her postoperative course was 

unremarkable vital signs are many stable and her temperature was afebrile 

throughout.  She was deemed stable for discharge on postoperative day #2 was 

discharged home to follow-up in the office in 2 weeks for an incision check and 

6 weeks routinely.  Discharge instructions included calling for any 

significantly increased bleeding or foul-smelling lochia, significantly 

increased fever or abdominal pain, perineal complaints, breast complaints, 

incisional complaints, or anything else that concerned her.  She was 

additionally instructed to have nothing in the vagina for at least 6 weeks time 

to include intercourse and to abstain from any heavy lifting over the same 

period of time.  She was last instructed to do no driving until off of all pain 

medications or 2 weeks' time, whichever came first.  She understood her 

instructions and agrees to follow up as noted above.  Discharge medications 

included over-the-counter analgesic pain medications as well as a prescription 

for Norco 5/325 mg, 1-2 by mouth every 6 hours when necessary pain, #20 

dispensed with no refills.  Maternal blood type is O+ and rubella status is 

immune.  Discharge hemoglobin and hematocrit were 7.0 and 22.8 respectively.  

The patient otherwise is asymptomatic and will be discharged home to take iron 

sulfate to attempt to increase her hemoglobin.  Her preoperative hemoglobin was 

8.6.


Procedures: 





#1.  Repeat low transverse  section #2.  Intraoperative bilateral tubal 

occlusion with Filshie clips


Patient Condition at Discharge: Stable





Plan - Discharge Summary


New Discharge Prescriptions: 


No Action


   Omeprazole [PriLOSEC] 40 mg PO DAILY


   Acetaminophen [Tylenol] 500 mg PO Q4-6H PRN


     PRN Reason: Pain


Discharge Medication List





Omeprazole [PriLOSEC] 40 mg PO DAILY 18 [History]


Acetaminophen [Tylenol] 500 mg PO Q4-6H PRN 18 [History]








Follow up Appointment(s)/Referral(s): 


Shaka De Jesus MD [STAFF PHYSICIAN] - 2 Weeks


Discharge Disposition: HOME SELF-CARE

## 2018-08-04 NOTE — P.MSEPDOC
Presenting Problems





- Arrival Data


Date of Arrival on Unit: 18


Time of Arrival on Unit: 12:09


Mode of Transport: Wheelchair





- Complaint


OB-Reason for Admission/Chief Complaint: Pain


Comment: severe back pain





Prenatal Medical History





- Pregnancy Information


: 3


Para: 1


Term: 1


: 0


Abortions: Spontaneous or Elective: 1


Number of Living Children: 1





- Gestational Age


Gestational Age by DESTINEY (wks/days): 37 Weeks and 0 Days





Review of Systems





- Review of Systems


Constitutional: No problems


Breast: No problems


ENT: No problems


Cardiovascular: No problems


Respiratory: No problems


Gastrointestinal: No problems


Genitourinary: No problems


Musculoskeletal: No problems


Neurological: No problems


Skin: No problems





Vital Signs





- Temperature


Temperature: 97.1 F


Temperature Source: Temporal Artery Scan





- Pulse


  ** Right Brachial


Pulse Rate: 83





- Respirations


Respiratory Rate: 18


Oxygen Delivery Method: Standby





- Blood Pressure


  ** Right Arm


Blood Pressure: 129/69


Blood Pressure Mean: 89





Medical Screen Scoring (Pre)





- Cervical Exam


Dilation: Exam Deferred


Effacement: Exam Deferred


Membranes: Intact





- Uterine Contractions


Frequency: N/A


Duration: N/A


Intensity: N/A





- Maternal Vital Signs


Maternal Temperature: N/A


Signs of Preeclampsia: N/A


Maternal Respirations: N/A





- Pain Assessment


Pain Location and Character: Back


Pain Scale Used: Numeric (1 - 10)


Pain Intensity: 10


Pain Management Goal: 4


Pain Description: *Acute


Pain Frequency: Constant


Pain Duration: 1


Pain Duration Units: Days


Pain Behavior: Fidgeting, Vocalization


Pain Aggravating Factors: Activity


Non-Pharmacological Interventions: Heat, Meditation





- Maternal Trauma


Maternal Trauma: N/A





- Fetal Assessment


Baseline FHR: 135


Fetal Heart Rate - NICHD Category: Category I (Normal) = 0


NST: Reactive


Fetal Position: Non-vertex & not laboring = 3


Fetal Station: N/A





- Total Score


Total Score (Pre): 3





- Level of Risk


Level of Risk: Low (0-5)





Physician Notification (Pre)





- Physician Notified


Physician Notified Date: 18


Physician Notified Time: 14:00


Physician/Practitioner Notifed:: Dr. De Jesus


Spoke With: Dr. De Jesus


New Order Received: Yes





- Notification Comment


Comment: Discharge pt home, use heat and tylenol as needed, try to see 

chiropractor





Disposition





- Disposition


OB Disposition: Discharge to home, Written follow up instructions reviewed


Discharge Date: 18


Discharge Time: 14:06


I agree with the RN Medical Screening Exam: Yes


Risk & Benefit of care provided described in d/c instruction: Yes


Diagnosis: PREGNANCY RELATED CONDITIONS, UNSPECIFIED, THIRD TRIMESTER

## 2019-01-16 ENCOUNTER — HOSPITAL ENCOUNTER (OUTPATIENT)
Dept: HOSPITAL 47 - RADCTMAIN | Age: 29
Discharge: HOME | End: 2019-01-16
Payer: COMMERCIAL

## 2019-01-16 DIAGNOSIS — E04.9: Primary | ICD-10-CM

## 2019-01-16 PROCEDURE — 70491 CT SOFT TISSUE NECK W/DYE: CPT

## 2019-01-16 NOTE — CT
EXAMINATION TYPE: CT soft tissue neck w con

 

DATE OF EXAM: 1/16/2019

 

HISTORY: Enlarged thyroid and lump to back of neck, history of prior thyroid surgery

 

COMPARISON: CT neck December 13, 2012. Thyroid ultrasound January 29, 2015

 

CT DLP: 292.6 mGycm.  Automated Exposure Control for Dose Reduction was Utilized.

 

TECHNIQUE:  CT scan of the neck is performed with IV Contrast, patient injected with 100 mL of Isovue
 300, axial images are obtained, coronal and sagittal reformatted images are reviewed.

 

FINDINGS:

 

Airway: Small amount of thyroid tissue posterior right thyroid bed abuts trachea axial image 28 measu
ring 4 x 5 mm. Airway is patent. Left thyroid lobe is unremarkable.

 

Parotid/submandibular glands:  No gross abnormality seen.

 

Carotid/Vascular Structures: Dominant right vertebral artery is redemonstrated. Vertebral arteries ar
e patent to basilar junction. No significant plaque or stenosis at carotid bulb level is seen.

 

Osseous Structures: Stable reversal of normal cervical curvature identified with slight levoconvex sc
oliosis centered in the upper thoracic spine again seen. 

 

Other: A metallic BB is placed at level of palpable abnormality posterior neck just right of midline 
axial image 53. No suspicious solid or cystic mass or abnormal fluid collection is identified at this
 level. No suspicious skin thickening is seen.

 

There are scattered subcentimeter lymph nodes throughout the neck bilaterally. No suspicious greater 
than 1 cm neck adenopathy is seen. Parapharyngeal fat spaces are symmetric and maintained.

 

IMPRESSION:

1. No suspicious mass identified to correlate with palpable abnormality posterior neck just right of 
midline at roughly C3 vertebral body level.

2. Tiny amount of thyroid tissue right thyroid bed noted not clearly seen on 2015 ultrasound needs to
 be correlated clinically with reason for prior thyroid surgery.

## 2019-02-15 ENCOUNTER — HOSPITAL ENCOUNTER (OUTPATIENT)
Dept: HOSPITAL 47 - RADUSWWP | Age: 29
End: 2019-02-15
Attending: SURGERY
Payer: COMMERCIAL

## 2019-02-15 DIAGNOSIS — Z90.89: ICD-10-CM

## 2019-02-15 DIAGNOSIS — Z85.850: ICD-10-CM

## 2019-02-15 DIAGNOSIS — E04.9: Primary | ICD-10-CM

## 2019-02-15 PROCEDURE — 76536 US EXAM OF HEAD AND NECK: CPT

## 2019-02-15 NOTE — US
EXAMINATION TYPE: US thyroid st tissue head/neck

 

DATE OF EXAM: 2/15/2019

 

COMPARISON: US & CT

 

CLINICAL HISTORY: E04.9 Goiter. Pt states left neck bulging x 1 month 

 

GLAND SIZE:

 

Left Lobe: 5.6 x 2.0 x 2.4 cm

** Overall Parenchyma:  heterogeneous

Isthmus Thickness:  0.4 cm

 

 

Bilateral neck scanned, no evidence of lymphadenopathy. Right lobe surgically absent, lymph node with
in right thyroid bed= 0.5 cm AP measurement. Left lobe enlarged, heterogeneous, and hypervascular.

 

 

IMPRESSION:

1. Right thyroidectomy. Hypervascular lymph node is seen within the thyroidectomy bed measuring 1.8 x
 0.5 cm. If there is history of right thyroid malignancy this lymph node would be viewed with suspici
on and fine-needle aspiration could be performed.

2. Hypervascular and enlarged left thyroid gland may clinically correlate with thyroiditis.

## 2019-02-27 ENCOUNTER — HOSPITAL ENCOUNTER (OUTPATIENT)
Dept: HOSPITAL 47 - RADNMMAIN | Age: 29
End: 2019-02-27
Payer: COMMERCIAL

## 2019-02-27 DIAGNOSIS — R22.1: Primary | ICD-10-CM

## 2019-02-27 PROCEDURE — 78014 THYROID IMAGING W/BLOOD FLOW: CPT

## 2019-02-28 NOTE — NM
EXAMINATION TYPE: NM thyroid image w uptake

 

DATE OF EXAM: 2/28/2019

 

COMPARISON: Prior nuclear medicine thyroid uptake and scan 2/29/2012, thyroid ultrasound 2/15/2019

 

HISTORY: Goiter

 

TECHNIQUE:  Thyroid iodine uptake is calculated and images performed after the oral administration of
 302 uCi 1-123 Capsule.

 

FINDINGS: There is increased activity within the left lobe, near absent right lobe.  Minimal uptake n
oted may represent some residual thyroid tissue in the right lobe. The 4 hour iodine uptake is calcul
ated at 41.9% (normal range 8-14%). The 24-hour iodine uptake is calculated at 57.7% (normal range 15
-35%).  

 

 

 

IMPRESSION: Correlate for thyroiditis, Graves' disease, additional findings above

## 2019-03-01 ENCOUNTER — HOSPITAL ENCOUNTER (OUTPATIENT)
Dept: HOSPITAL 47 - RADPROMAIN | Age: 29
Discharge: HOME | End: 2019-03-01
Attending: SURGERY
Payer: COMMERCIAL

## 2019-03-01 VITALS — RESPIRATION RATE: 16 BRPM | TEMPERATURE: 98.1 F

## 2019-03-01 VITALS — HEART RATE: 56 BPM | DIASTOLIC BLOOD PRESSURE: 68 MMHG | SYSTOLIC BLOOD PRESSURE: 110 MMHG

## 2019-03-01 DIAGNOSIS — Z98.890: ICD-10-CM

## 2019-03-01 DIAGNOSIS — R93.89: Primary | ICD-10-CM

## 2019-03-01 PROCEDURE — 88305 TISSUE EXAM BY PATHOLOGIST: CPT

## 2019-03-01 PROCEDURE — 10005 FNA BX W/US GDN 1ST LES: CPT

## 2019-03-01 PROCEDURE — 88173 CYTOPATH EVAL FNA REPORT: CPT

## 2019-03-01 NOTE — US
EXAMINATION TYPE: US FNA first lesion

 

DATE OF EXAM: 3/1/2019

 

HISTORY: Status post right hemithyroidectomy. Abnormal ultrasound

 

FINDINGS: Maximal barrier technique was utilized.  The skin overlying a suitable path to the patient'
s lymph node in the right thyroid bed was localized with ultrasound and the overlying skin prepped an
d draped.  Ultrasound was utilized with sterile technique. Lidocaine was  used for local anesthesia. 
A single pass with a 25-gauge needle was made into the ultrasound guidance and aspirate specimen subm
itted to cytology. Following the procedure, hemostasis achieved and the patient is discharged in stab
le condition without complication.

 

IMPRESSION:STATUS POST ULTRASOUND GUIDED FINE-NEEDLE ASPIRATION OF LYMPH NODE AS DESCRIBED IN THE RIG
HT NECK, PATHOLOGY IS PENDING.  THIS PROCEDURE IS PERFORMED BY THE UNDERSIGNED.

## 2019-09-09 ENCOUNTER — HOSPITAL ENCOUNTER (OUTPATIENT)
Dept: HOSPITAL 47 - RADUSWWP | Age: 29
Discharge: HOME | End: 2019-09-09
Attending: SURGERY
Payer: COMMERCIAL

## 2019-09-09 DIAGNOSIS — E89.0: ICD-10-CM

## 2019-09-09 DIAGNOSIS — E04.1: Primary | ICD-10-CM

## 2019-09-09 PROCEDURE — 76536 US EXAM OF HEAD AND NECK: CPT

## 2019-09-09 NOTE — US
EXAMINATION TYPE: US thyroid st tissue head/neck

 

DATE OF EXAM: 9/9/2019

 

COMPARISON: NONE

 

CLINICAL HISTORY: E04.1 thyroid nodule. Thyroid nodule. Right thyroid removed 8 years ago.

 

GLAND SIZE:

 

Right Lobe: Surgically absent cm

Left Lobe: 4.8 x 1.3 x 2.0  cm

** Overall Parenchyma:  heterogeneous

Isthmus Thickness:  .2 cm

 

NODULES

 

RIGHT:    Hypoechoic area seen right thyroid bed 1.8 x .5 x .5 cm.

 

 

 

LEFT:    # of nodules measured on left:  0

 

 

ISTHMUS:    # of nodules measured in the isthmus:  0

 

Bilateral neck scanned, no evidence of lymphadenopathy.

 

Stable hypoechoic area right thyroid bed favors a benign lymph node given subcentimeter appearance sh
ort axis and stability. Heterogeneous normal sized left thyroid remnant without new nodule.

 

 

 

IMPRESSION: As above, overall stable findings.

## 2020-08-06 ENCOUNTER — HOSPITAL ENCOUNTER (EMERGENCY)
Dept: HOSPITAL 47 - EC | Age: 30
Discharge: HOME | End: 2020-08-06
Payer: COMMERCIAL

## 2020-08-06 VITALS
HEART RATE: 66 BPM | RESPIRATION RATE: 18 BRPM | SYSTOLIC BLOOD PRESSURE: 122 MMHG | DIASTOLIC BLOOD PRESSURE: 88 MMHG | TEMPERATURE: 97.8 F

## 2020-08-06 DIAGNOSIS — F90.9: ICD-10-CM

## 2020-08-06 DIAGNOSIS — Z85.850: ICD-10-CM

## 2020-08-06 DIAGNOSIS — L03.213: Primary | ICD-10-CM

## 2020-08-06 DIAGNOSIS — R23.8: ICD-10-CM

## 2020-08-06 DIAGNOSIS — Z88.8: ICD-10-CM

## 2020-08-06 DIAGNOSIS — K21.9: ICD-10-CM

## 2020-08-06 DIAGNOSIS — Z79.899: ICD-10-CM

## 2020-08-06 PROCEDURE — 99283 EMERGENCY DEPT VISIT LOW MDM: CPT

## 2020-08-06 NOTE — ED
Eye Problem HPI





- General


Chief complaint: Eye Problems


Stated complaint: Left eye swollen


Time Seen by Provider: 20 10:25


Source: patient, RN notes reviewed, old records reviewed


Mode of arrival: ambulatory


Limitations: no limitations





- History of Present Illness


Initial comments: 





Patient is a pleasant 30-year-old female who presents emergency department today

for concern for swelling and irritation around the left eye.  Patient reports 

that she had a small pimple that she try to pop this week.  She denies any 

visual changes.  She states that she has no pain with extraocular eye movements.

 She denies any eye pain or drainage.  Patient states that it is irritating with

the surrounding swelling around the eye.  She denies any fevers or chills.  

Denies any history of resistant skin infections.





- Related Data


                                Home Medications











 Medication  Instructions  Recorded  Confirmed


 


Dextroamphetamine/Amphetamine 20 mg PO BID@0800,1300 19





[Adderall]   


 


Ibuprofen [Motrin] 800 mg PO DAILY PRN 19


 


Cyclobenzaprine [Flexeril] 10 mg PO HS PRN 20


 


Ergocalciferol [Vitamin D2] 50,000 unit PO Q30D 20


 


Omeprazole 20 mg PO DAILY 20








                                  Previous Rx's











 Medication  Instructions  Recorded


 


Cephalexin [Keflex] 500 mg PO Q6HR 7 Days #28 cap 20











                                    Allergies











Allergy/AdvReac Type Severity Reaction Status Date / Time


 


prochlorperazine maleate Allergy  Unknown Verified 20 10:51





[From Compazine]     














Review of Systems


ROS Statement: 


Those systems with pertinent positive or pertinent negative responses have been 

documented in the HPI.





ROS Other: All systems not noted in ROS Statement are negative.





Past Medical History


Past Medical History: Asthma, Cancer, GERD/Reflux, Thyroid Disorder


Additional Past Medical History / Comment(s): Mild Anemia.  known B-thalasemia 

minor.  thyroid ca


History of Any Multi-Drug Resistant Organisms: None Reported


Past Surgical History: Adenoidectomy,  Section, Cholecystectomy, 

Tonsillectomy


Additional Past Surgical History / Comment(s): partial Thyroidectomy.  C/S x2


Past Anesthesia/Blood Transfusion Reactions: Postoperative Nausea & Vomiting 

(PONV)


Past Psychological History: ADD/ADHD, Anxiety


Smoking Status: Never smoker


Past Alcohol Use History: None Reported


Past Drug Use History: None Reported





- Past Family History


  ** Mother


Family Medical History: No Reported History





General Exam





- General Exam Comments


Initial Comments: 





Alert and oriented 30-year-old female.  No significant distress.


Limitations: no limitations


General appearance: alert, in no apparent distress


Head exam: Present: atraumatic, normocephalic, normal inspection


Eye exam: Present: normal appearance, PERRL, EOMI, periorbital swelling (Patient

is left-sided.  Orbital swelling.  It is minimal.  She has a localized pimple on

the lateral portion of the eye.  No purulent drainage at this time.).  Absent: 

scleral icterus, conjunctival injection


ENT exam: Present: normal exam, mucous membranes moist


Neck exam: Present: normal inspection.  Absent: tenderness, meningismus, 

lymphadenopathy


Respiratory exam: Present: normal lung sounds bilaterally.  Absent: respiratory 

distress, wheezes, rales, rhonchi, stridor


Cardiovascular Exam: Present: regular rate, normal rhythm, normal heart sounds. 

Absent: systolic murmur, diastolic murmur, rubs, gallop, clicks


GI/Abdominal exam: Present: soft, normal bowel sounds.  Absent: distended, 

tenderness, guarding, rebound, rigid


Extremities exam: Present: normal inspection, full ROM, normal capillary refill.

 Absent: tenderness, pedal edema, joint swelling, calf tenderness


Back exam: Present: normal inspection


Neurological exam: Present: alert, oriented X3, CN II-XII intact


Psychiatric exam: Present: normal affect, normal mood





Course


                                   Vital Signs











  20





  10:21


 


Temperature 97.6 F


 


Pulse Rate 69


 


Respiratory 16





Rate 


 


Blood Pressure 118/78


 


O2 Sat by Pulse 97





Oximetry 














Medical Decision Making





- Medical Decision Making





30-year-old female presents emergency department today for chief complaint of 

swelling around the left eye.  She is instructed by movements.  4 scene eye exam

appears normal.  Visual acuity is intact or signs that should fact she wears 

glasses.  At this time Patient has a small pimple near the left periorbital 

area.  Discussed this likely some periorbital cellulitis irritation related to 

the temple.  We'll start the Patient on Keflex at this time has had no history 

of resistant skin infection.  Advised follow-up with primary care doctor if 

symptoms continue persist or worsen.  Given a note for work as well.





Disposition


Clinical Impression: 


 Periorbital cellulitis of left eye, Skin pimple





Disposition: HOME SELF-CARE


Condition: Good


Instructions (If sedation given, give patient instructions):  Periorbital 

Cellulitis in Adults (ED)


Additional Instructions: 


Patient advised to apply warm compresses over the area of the pimple.  Take the 

antibiotics as prescribed.  Return to the emergency department if any alarming 

signs or symptoms occur.


Prescriptions: 


Cephalexin [Keflex] 500 mg PO Q6HR 7 Days #28 cap


Is patient prescribed a controlled substance at d/c from ED?: No


Referrals: 


Israel Winston MD [Primary Care Provider] - 1-2 days


Time of Disposition: 10:58

## 2021-04-15 ENCOUNTER — HOSPITAL ENCOUNTER (OUTPATIENT)
Dept: HOSPITAL 47 - RADMRIMAIN | Age: 31
Discharge: HOME | End: 2021-04-15
Attending: ORTHOPAEDIC SURGERY
Payer: COMMERCIAL

## 2021-04-15 DIAGNOSIS — M50.21: Primary | ICD-10-CM

## 2021-04-15 DIAGNOSIS — M51.24: ICD-10-CM

## 2021-04-15 PROCEDURE — 72141 MRI NECK SPINE W/O DYE: CPT

## 2021-04-16 NOTE — MR
EXAMINATION TYPE: MR cervical spine wo con

 

DATE OF EXAM: 4/15/2021

 

COMPARISON: None

 

HISTORY: Neck pain, headaches, BUE weakness x 2 years.

 

CONTRAST: None.  

 

TECHNIQUE: Multiplanar multiecho imaging on a 3.0 Taylor magnet is performed through the cervical spin
e.

 

FINDINGS: The craniovertebral junction is normal.  Vertebral body alignment has kyphosis centered at 
approximately C3-4. Some lordosis in the lower cervical spine is present. AP projection appears chaim
l.

 

There may be some mild disc bulge present C3-4 with mild anterior thecal sac impression. No AP spinal
 canal stenosis is present. No cord contact is evident. Neural foramen are patent. Very minimal disc 
bulge may be present C4-5 without significant thecal sac compression cord contact or spinal canal jovi
nosis. 

 

Disc heights appear preserved. Vertebral body heights are preserved. Spinal cord maintains normal sig
nal throughout its visualized course.

 

Incidental note is made of a disc herniation T2-T3 with extension beyond the endplate of T2 in the sa
gittal plane. No obvious cord contact is evident. This level was not evaluated with MRI in the axial 
plane.

 

IMPRESSIONS:

 

1. Kyphosis of the upper cervical spine.

2. Minimal disc bulging present C3-4 and possibly C4-5.

3. Note is made of a T2-T3 disc herniation.

## 2021-05-12 ENCOUNTER — HOSPITAL ENCOUNTER (OUTPATIENT)
Dept: HOSPITAL 47 - RADCTMAIN | Age: 31
Discharge: HOME | End: 2021-05-12
Attending: INTERNAL MEDICINE
Payer: COMMERCIAL

## 2021-05-12 DIAGNOSIS — Z85.850: ICD-10-CM

## 2021-05-12 DIAGNOSIS — R22.1: Primary | ICD-10-CM

## 2021-05-12 DIAGNOSIS — M54.2: ICD-10-CM

## 2021-05-12 PROCEDURE — 72125 CT NECK SPINE W/O DYE: CPT

## 2021-05-12 NOTE — CT
EXAMINATION TYPE: CT cervical spine wo con

 

DATE OF EXAM: 5/12/2021

 

COMPARISON: 3/31/2019 soft tissue neck

 

HISTORY: posterior neck mass (marked by bb), neck pain, history of thyroid cancer

 

CT DLP: 397 mGycm

 

CONTRAST: None

 

CT of the cervical spine is performed in the axial plane at 2 mm thick sections.  Reconstructed image
s in the coronal, and sagittal plane are reviewed on the computer. 

 

No acute fractures are evident.

 

There is a cervical kyphosis present centered at approximately C3-4.

Disc heights are preserved.  

Vertebral body heights are preserved.

No spinal canal stenosis is evident

No neural foraminal stenosis is evident.

 

Posterior right neck as marked by BB. This is approximately the C2-C3 level posteriorly. No discrete 
mass is evident. There are scattered tiny lymph nodes present within the posterior neck.

 

IMPRESSIONS:

1.   Normal CT cervical spine.

## 2021-10-29 ENCOUNTER — HOSPITAL ENCOUNTER (OUTPATIENT)
Dept: HOSPITAL 47 - RADUSWWP | Age: 31
Discharge: HOME | End: 2021-10-29
Attending: SURGERY
Payer: COMMERCIAL

## 2021-10-29 DIAGNOSIS — E04.1: Primary | ICD-10-CM

## 2021-10-29 PROCEDURE — 76536 US EXAM OF HEAD AND NECK: CPT

## 2021-10-29 NOTE — US
EXAMINATION TYPE: US thyroid st tissue head/neck

 

DATE OF EXAM: 10/29/2021

 

COMPARISON: NONE

 

CLINICAL HISTORY: E04.1 THYROID NODULE.

 

GLAND SIZE:

 

Right Lobe: Surgically absent 

Left Lobe: 4.9 x 1.4 x 2.0 cm

** Overall Parenchyma:  homogeneous

Isthmus Thickness:  0.2 cm

 

NODULES

 

RIGHT:   # of nodules measured on right: 0

 

LEFT:    # of nodules measured on left:  0

 

Bilateral neck scanned, no evidence of lymphadenopathy.

 

 

Right thyroid fossa shows hypoechoic focus measuring 0.8 x 0.4 x 0.4cm, possible enlarged lymph node,
 stable.

 

IMPRESSION:

Benign and stable thyroid ultrasound

## 2022-12-02 ENCOUNTER — HOSPITAL ENCOUNTER (OUTPATIENT)
Dept: HOSPITAL 47 - RADUSWWP | Age: 32
Discharge: HOME | End: 2022-12-02
Attending: SURGERY
Payer: COMMERCIAL

## 2022-12-02 DIAGNOSIS — E04.1: Primary | ICD-10-CM

## 2022-12-02 PROCEDURE — 76536 US EXAM OF HEAD AND NECK: CPT

## 2022-12-03 NOTE — US
EXAMINATION TYPE: US thyroid st tissue head/neck

 

DATE OF EXAM: 12/2/2022

 

COMPARISON: Ultrasound 10/29/2021

 

CLINICAL HISTORY: E04.1 Thyroid nodule.

 

GLAND SIZE:

 

Right Lobe: Surgically absent 

 

Left Lobe: 5.0 x 1.2 x 2.4 cm

** Overall Parenchyma:  heterogeneous

Isthmus Thickness:  0.2 cm

 

NODULES

RIGHT:   # of nodules measured on right: 2

1.  0.8 X 0.3  x 0.4 cm, upper mid, this has the appearance of a lymph node

2.  1.0 X 0.3  x 0.4 cm, lower mid, this has the appearance of a lymph node

 

LEFT:    # of nodules measured on left:  0

 

ISTHMUS:    # of nodules measured in the isthmus:  0

 

Bilateral neck scanned, no evidence of lymphadenopathy.

 

IMPRESSION:

Stable suspected right neck lymph nodes. No evidence for recurrence or suspicious nodule.

## 2023-12-04 ENCOUNTER — HOSPITAL ENCOUNTER (OUTPATIENT)
Dept: HOSPITAL 47 - RADUSWWP | Age: 33
Discharge: HOME | End: 2023-12-04
Payer: COMMERCIAL

## 2023-12-04 DIAGNOSIS — E04.2: Primary | ICD-10-CM

## 2023-12-04 DIAGNOSIS — Z90.89: ICD-10-CM

## 2023-12-04 PROCEDURE — 76536 US EXAM OF HEAD AND NECK: CPT

## 2023-12-04 NOTE — US
EXAMINATION TYPE: US thyroid st tissue head/neck

 

DATE OF EXAM: 12/4/2023

 

COMPARISON: 12/2/2022.

 

CLINICAL INDICATION: Female, 33 years old with history of E04.1 NONTOXIC SINGLE THYROID NODULE; Right
 thyroidectomy - on thyroid medications 

 

GLAND SIZE:

 

Right Lobe: Surgically absent cm

** Overall Parenchyma:  NA

Left Lobe: 5.2 x 1.5 x 2.2 cm

** Overall Parenchyma:  homogeneous

Isthmus Thickness:  NAcm

 

NODULES

 

RIGHT:   Surgically Absent

? Lymph nodes redemonstrated within right thyroid fossa 

Sup = 1.0 x 0.5 x 0.6 cm

Inf = 1.1 x 0.5 x 0.8 cm

 

LEFT:    # of nodules noted on the left: 2 subcentimeter and predominantly hypoechoic. Attention foll
ow-up imaging.

 

Bilateral neck scanned, no evidence of lymphadenopathy.

 

 

 

IMPRESSION:

1.  A couple less than 1.0 cm thyroid nodules visualized and not definitively seen on prior. Attentio
n follow-up imaging.

2.  Redemonstration of lymph nodes within the right surgical bed.